# Patient Record
Sex: FEMALE | Race: AMERICAN INDIAN OR ALASKA NATIVE | URBAN - METROPOLITAN AREA
[De-identification: names, ages, dates, MRNs, and addresses within clinical notes are randomized per-mention and may not be internally consistent; named-entity substitution may affect disease eponyms.]

---

## 2022-06-01 ENCOUNTER — OFFICE VISIT (OUTPATIENT)
Dept: URBAN - METROPOLITAN AREA CLINIC 23 | Facility: CLINIC | Age: 57
End: 2022-06-01
Payer: COMMERCIAL

## 2022-06-01 DIAGNOSIS — H25.13 AGE-RELATED NUCLEAR CATARACT, BILATERAL: ICD-10-CM

## 2022-06-01 DIAGNOSIS — G51.0 BELL'S PALSY: ICD-10-CM

## 2022-06-01 DIAGNOSIS — M35.01 SICCA SYNDROME W/ KERATOCONJUNCTIVITIS: Primary | ICD-10-CM

## 2022-06-01 PROCEDURE — 99203 OFFICE O/P NEW LOW 30 MIN: CPT | Performed by: OPHTHALMOLOGY

## 2022-06-01 ASSESSMENT — INTRAOCULAR PRESSURE
OS: 11
OD: 11

## 2022-06-01 ASSESSMENT — KERATOMETRY
OS: 40.75
OD: 41.75

## 2022-06-01 NOTE — IMPRESSION/PLAN
Impression: Sicca syndrome w/ keratoconjunctivitis: M35.01. Left. Plan: Discussed diagnosis in detail with patient. Discussed risks of progression. Discussed treatment options with patient. Patient instructed to use artificial tears as needed. Recommend patching OS. Defer cataract surgery until Bell's Palsy resolved.

## 2022-06-01 NOTE — IMPRESSION/PLAN
Impression: Age-related nuclear cataract, bilateral: H25.13. Plan: Discussed diagnosis in detail with patient. No treatment is required at this time. Discussed risks of progression. Will continue to observe condition and or symptoms. Call if [address] worsens.